# Patient Record
Sex: FEMALE | Race: WHITE
[De-identification: names, ages, dates, MRNs, and addresses within clinical notes are randomized per-mention and may not be internally consistent; named-entity substitution may affect disease eponyms.]

---

## 2017-05-22 ENCOUNTER — HOSPITAL ENCOUNTER (OUTPATIENT)
Dept: HOSPITAL 75 - RAD | Age: 32
End: 2017-05-22
Attending: NURSE PRACTITIONER
Payer: COMMERCIAL

## 2017-05-22 DIAGNOSIS — M71.22: ICD-10-CM

## 2017-05-22 DIAGNOSIS — M23.262: Primary | ICD-10-CM

## 2017-05-22 PROCEDURE — 73721 MRI JNT OF LWR EXTRE W/O DYE: CPT

## 2017-05-22 NOTE — DIAGNOSTIC IMAGING REPORT
PROCEDURE: MRI left joint lower extremity without contrast.



TECHNIQUE: Multiplanar, multisequence non contrast-enhanced MRI

of the left lower extremity was accomplished.



INDICATION: Fall. Left knee pain.



FINDINGS:

Please note than an alternate coil was used as the dedicated knee

coil did not fit on the patient large size knee. This results in

less than usual resolution overall with detection sensitivity for

major abnormalities still acceptable.



There is a minimal suprapatellar effusion. Minimal amount of

fluid in the popliteal fossa compatible with a Baker's cyst

measuring 1.2 x 0.7 x 0.5 cm is seen.



The extensor mechanism appears intact.



The ACL and the PCL appear intact. There is no definite meniscal

tear. Questionable increased signal through the posterior horn of

the lateral meniscus with no definite interruption of the

articular surface to suggest a tear.



The MCL and the lateral collateral ligament complex appear

intact.



No significant marrow signal abnormality.



The cartilage appears grossly unremarkable in the three

compartments.



IMPRESSION: Tiny Baker's cyst. Small suprapatellar effusion.

There is decreased resolution of images due to use of alternate

coil. Question of degenerative signal in the posterior horn of

the lateral meniscus with no obvious tear.



Dictated by: 



  Dictated on workstation # [id number]

## 2018-04-17 ENCOUNTER — HOSPITAL ENCOUNTER (OUTPATIENT)
Dept: HOSPITAL 75 - OCC | Age: 33
Discharge: HOME | End: 2018-04-17
Attending: NURSE PRACTITIONER

## 2018-04-17 PROCEDURE — 73660 X-RAY EXAM OF TOE(S): CPT

## 2018-04-17 NOTE — DIAGNOSTIC IMAGING REPORT
PATIENT HISTORY: FELL. 



TECHNIQUE: AP view of the left foot. Oblique and lateral views of

the left great toe.



COMPARISON: None.



FINDINGS: There is a nondisplaced avulsion fracture at the volar

and medial aspect of the left great toe distal phalangeal base.

There is surrounding soft tissue edema. No additional fractures

are seen. Alignment otherwise appears normal.



IMPRESSION: A nondisplaced avulsion fracture at the left great

toe distal phalangeal base.



Dictated by: 



  Dictated on workstation # [id number]

## 2022-11-25 ENCOUNTER — HOSPITAL ENCOUNTER (OUTPATIENT)
Dept: HOSPITAL 75 - RAD | Age: 37
End: 2022-11-25
Attending: OBSTETRICS & GYNECOLOGY
Payer: COMMERCIAL

## 2022-11-25 DIAGNOSIS — Z80.3: ICD-10-CM

## 2022-11-25 DIAGNOSIS — Z12.31: Primary | ICD-10-CM

## 2022-11-25 PROCEDURE — 77063 BREAST TOMOSYNTHESIS BI: CPT

## 2022-11-25 PROCEDURE — 77067 SCR MAMMO BI INCL CAD: CPT

## 2022-11-28 NOTE — DIAGNOSTIC IMAGING REPORT
INDICATION: Routine screening.



No prior mammograms are available for comparison. This is a

baseline study.



Scattered fibroglandular densities are identified bilaterally.

There appears to be an intraparenchymal lymph node in the upper

outer left breast posterior depth. No spiculated mass or

malignant-appearing microcalcifications are seen. Axillae are

unremarkable.



IMPRESSION: No mammographic features suspicious for malignancy

are identified.



ACR BI-RADS Category 2: Benign findings.

Result letter will be mailed to the patient.

Note: At least 10% of breast cancer is not imaged by mammography.



BI-RADS Category 2





  Dictated on workstation # [id number]

## 2023-02-14 ENCOUNTER — HOSPITAL ENCOUNTER (OUTPATIENT)
Dept: HOSPITAL 75 - RAD | Age: 38
End: 2023-02-14
Attending: NURSE PRACTITIONER
Payer: COMMERCIAL

## 2023-02-14 DIAGNOSIS — R05.9: Primary | ICD-10-CM

## 2023-02-14 PROCEDURE — 71046 X-RAY EXAM CHEST 2 VIEWS: CPT

## 2023-02-14 NOTE — DIAGNOSTIC IMAGING REPORT
EXAMINATION: Chest 2 view



HISTORY: Cough



COMPARISON: None available.



FINDINGS: 



The lungs are clear without edema or pneumonia. No pleural

effusion or pneumothorax. Heart size is normal.



IMPRESSION: 



1. Clear lungs.



Dictated by: 



  Dictated on workstation # [id number]

## 2023-05-18 ENCOUNTER — HOSPITAL ENCOUNTER (OUTPATIENT)
Dept: HOSPITAL 75 - RAD | Age: 38
End: 2023-05-18
Attending: NURSE PRACTITIONER
Payer: COMMERCIAL

## 2023-05-18 DIAGNOSIS — L03.031: Primary | ICD-10-CM

## 2023-05-18 PROCEDURE — 73660 X-RAY EXAM OF TOE(S): CPT

## 2023-05-18 NOTE — DIAGNOSTIC IMAGING REPORT
INDICATION: Pain. 



FINDINGS: Soft tissue swelling over the great toe. No gas or

opaque foreign body. No bony destruction or abnormal periosteal

reaction. No fracture. 



IMPRESSION: Soft tissue swelling over the distal great toe. No

other significant finding. 



Dictated by: 



  Dictated on workstation # [id number]